# Patient Record
(demographics unavailable — no encounter records)

---

## 2025-03-20 NOTE — ASSESSMENT
[FreeTextEntry1] : My impression is that the patient has right first dorsal compartment tenosynovitis. Initial treatment options were discussed. Shared decision-making was made to proceed with a dedicated trial of right wrist thumb spica splint immobilization, activity modification (working on decreasing things that aggravate his symptoms), anti-inflammatory medication & corticosteroid injection. The patient tolerated the injection well today. I explained it may take 1-2 weeks for the steroid to take effect. I recommended 4 weeks of splint wear. I explained that recurrence of symptoms is not uncommon and if this were to happen, consideration for another injection will be made. She will plan to follow up with me on an as-needed basis. All questions were answered and she was very well in accordance with the plan.  I recommended a dedicated trial of rest and therefore will be out of work for the next 4 weeks.

## 2025-03-20 NOTE — PHYSICAL EXAM
[de-identified] : Physical exam demonstrates the patient to be alert and oriented x 3 and capable of ambulation. The patient is well-developed and well-nourished in no apparent respiratory distress. The majority of the skin is intact bilaterally in the upper extremities without any bilateral elbow lymphadenopathy.  Evaluation of both elbows reveals full symmetric range of motion from full extension to 140 of flexion with full pronation and full supination. Nontender over medial and lateral epicondyles bilaterally. Biceps and triceps intact with excellent strength bilaterally. No palpable elbow effusion bilaterally. No signs of instability bilaterally including valgus instability and posterior lateral rotatory instability. Excellent brachial artery pulse (2+) bilaterally.  There is diminished active and passive right wrist flexion secondary to apprehension and discomfort compared to the left side.  Tender over the right first dorsal compartment, positive Finkelstein test.  Nontender over the right thumb CMC joint.  There is no tenderness over the scaphoid, scapholunate, or lunotriquetral ligaments bilaterally. There is a negative Carvajal's test bilaterally. There is no tenderness over the distal radial ulnar joint or TFCC and no evidence of instability bilaterally. There is no tenderness over the pisotriquetral joint, hamate hook, or CMC joints bilaterally. The patient is nontender over both scaphoids and anatomic snuffbox is bilaterally. There is no clubbing cyanosis or edema.  Full, symmetric digital ROM.   There is good capillary refill of the digits bilaterally. There are no masses palpated or sensitivity over the median and ulnar nerves at the level of the wrist.  Sensation is intact to light touch bilaterally. [de-identified] : PA, lateral and oblique X-Rays of the right wrist obtained at [facility] on 3/2/2025 were reviewed with the patient today. No evidence of acute fracture or dislocation. 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

## 2025-03-20 NOTE — HISTORY OF PRESENT ILLNESS
[FreeTextEntry1] : Date of injury: March 1, 2025 (19 days ago)  55 year old female patient is presenting for evaluation of right radial sided wrist pain which started after she was lifting her equipment while at work. 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 The patient denies any fall or popping sensation during the incident. She was initially seen at [facility] where she had X-Rays obtained. The patient was given a thumb spica splint and recommended to follow up with a hand specialist.

## 2025-03-20 NOTE — PROCEDURE
[FreeTextEntry1] : My impression is that the patient is suffering from right de Quervain's tenosynovitis. I therefore recommended that the patient undergo a first dorsal compartment injection. The risks benefits and alternatives were discussed with the patient. This included (but was not limited to) subcutaneous atrophy, depigmentation, nerve injury, RSD, infection etc... the patient agreed and under informed consent and sterile conditions 1/2 cc of Kenalog and 1/2 cc of 2% plain lidocaine was precisely injected into the first dorsal compartment. It is my hope that this significantly alleviates the symptoms. Should the symptoms not disappear or should they recur then the patient should contact me. All questions were answered.

## 2025-03-20 NOTE — PHYSICAL EXAM
[de-identified] : Physical exam demonstrates the patient to be alert and oriented x 3 and capable of ambulation. The patient is well-developed and well-nourished in no apparent respiratory distress. The majority of the skin is intact bilaterally in the upper extremities without any bilateral elbow lymphadenopathy.  Evaluation of both elbows reveals full symmetric range of motion from full extension to 140 of flexion with full pronation and full supination. Nontender over medial and lateral epicondyles bilaterally. Biceps and triceps intact with excellent strength bilaterally. No palpable elbow effusion bilaterally. No signs of instability bilaterally including valgus instability and posterior lateral rotatory instability. Excellent brachial artery pulse (2+) bilaterally.  There is diminished active and passive right wrist flexion secondary to apprehension and discomfort compared to the left side.  Tender over the right first dorsal compartment, positive Finkelstein test.  Nontender over the right thumb CMC joint.  There is no tenderness over the scaphoid, scapholunate, or lunotriquetral ligaments bilaterally. There is a negative Carvajal's test bilaterally. There is no tenderness over the distal radial ulnar joint or TFCC and no evidence of instability bilaterally. There is no tenderness over the pisotriquetral joint, hamate hook, or CMC joints bilaterally. The patient is nontender over both scaphoids and anatomic snuffbox is bilaterally. There is no clubbing cyanosis or edema.  Full, symmetric digital ROM.   There is good capillary refill of the digits bilaterally. There are no masses palpated or sensitivity over the median and ulnar nerves at the level of the wrist.  Sensation is intact to light touch bilaterally. [de-identified] : PA, lateral and oblique X-Rays of the right wrist obtained at [facility] on 3/2/2025 were reviewed with the patient today. No evidence of acute fracture or dislocation. 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

## 2025-03-20 NOTE — HISTORY OF PRESENT ILLNESS
[FreeTextEntry1] : Date of injury: March 1, 2025 (19 days ago)  55 year old female patient is presenting for evaluation of right radial sided wrist pain which started after she was lifting her equipment while at work. 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 The patient denies any fall or popping sensation during the incident. She was initially seen at [facility] where she had X-Rays obtained. The patient was given a thumb spica splint and recommended to follow up with a hand specialist.